# Patient Record
Sex: MALE | Race: BLACK OR AFRICAN AMERICAN | NOT HISPANIC OR LATINO
[De-identification: names, ages, dates, MRNs, and addresses within clinical notes are randomized per-mention and may not be internally consistent; named-entity substitution may affect disease eponyms.]

---

## 2020-11-23 ENCOUNTER — LABORATORY RESULT (OUTPATIENT)
Age: 55
End: 2020-11-23

## 2020-11-23 ENCOUNTER — APPOINTMENT (OUTPATIENT)
Dept: INTERNAL MEDICINE | Facility: CLINIC | Age: 55
End: 2020-11-23
Payer: COMMERCIAL

## 2020-11-23 VITALS
SYSTOLIC BLOOD PRESSURE: 160 MMHG | OXYGEN SATURATION: 97 % | BODY MASS INDEX: 30.1 KG/M2 | HEIGHT: 71 IN | HEART RATE: 65 BPM | TEMPERATURE: 98.2 F | WEIGHT: 215 LBS | DIASTOLIC BLOOD PRESSURE: 100 MMHG

## 2020-11-23 DIAGNOSIS — Z78.9 OTHER SPECIFIED HEALTH STATUS: ICD-10-CM

## 2020-11-23 DIAGNOSIS — Z23 ENCOUNTER FOR IMMUNIZATION: ICD-10-CM

## 2020-11-23 DIAGNOSIS — Z11.3 ENCOUNTER FOR SCREENING FOR INFECTIONS WITH A PREDOMINANTLY SEXUAL MODE OF TRANSMISSION: ICD-10-CM

## 2020-11-23 DIAGNOSIS — Z80.3 FAMILY HISTORY OF MALIGNANT NEOPLASM OF BREAST: ICD-10-CM

## 2020-11-23 PROCEDURE — 99386 PREV VISIT NEW AGE 40-64: CPT | Mod: 25

## 2020-11-23 PROCEDURE — G0444 DEPRESSION SCREEN ANNUAL: CPT

## 2020-11-23 PROCEDURE — 36415 COLL VENOUS BLD VENIPUNCTURE: CPT

## 2020-11-23 PROCEDURE — G0442 ANNUAL ALCOHOL SCREEN 15 MIN: CPT

## 2020-11-23 NOTE — HEALTH RISK ASSESSMENT
[Good] : ~his/her~  mood as  good [Yes] : Yes [2 - 4 times a month (2 pts)] : 2-4 times a month (2 points) [3 or 4 (1 pt)] : 3 or 4  (1 point) [Never (0 pts)] : Never (0 points) [No falls in past year] : Patient reported no falls in the past year [0] : 2) Feeling down, depressed, or hopeless: Not at all (0) [Patient reported colonoscopy was normal] : Patient reported colonoscopy was normal [With Significant Other] : lives with significant other [Employed] : employed [] :  [] : No [Audit-CScore] : 3 [de-identified] : walking  [[id number]] : 0 [Reports changes in hearing] : Reports no changes in hearing [Reports changes in vision] : Reports no changes in vision [Reports changes in dental health] : Reports no changes in dental health [ColonoscopyDate] : 2011  [FreeTextEntry2] : [place]

## 2020-11-23 NOTE — HISTORY OF PRESENT ILLNESS
[de-identified] : new pt referred by his co worker \par \par HTN dx 2012 \par - on metoprolol 50 BID and hydralazine 25 tid \par - BP has been high in MD office - does not know if this is working - today he did not take his medications \par

## 2020-11-23 NOTE — ASSESSMENT
[FreeTextEntry1] : HTN- elevated BP readings confirmed - pt did not take his medications this morning \par --no cp sob palpitation or dizzy spells , no leg swelling \par -continue current medications \par - educated low salt diet , avoid canned , processed and fast food ,chips , bagged items ,  start exercise daily 30- 40 minutes  , loose weight \par -f/u 4- 6 weeks check BP\par \par Health maintenance \par colonoscope-> 10 yr ago referral for gastro given \par Psa- ordered \par Flu vaccine -refused \par Tdap - refused \par HEP C screening-(educated pt CDC recommendation one time screening for patients born between 1945 -1965 )- ordered \par STD offered-ordered \par Dermatology consult advised -for skin ca screening \par \par RTC 4 weeks

## 2020-11-24 LAB
25(OH)D3 SERPL-MCNC: 29.3 NG/ML
ALBUMIN SERPL ELPH-MCNC: 4.5 G/DL
ALP BLD-CCNC: 76 U/L
ALT SERPL-CCNC: 22 U/L
ANION GAP SERPL CALC-SCNC: 9 MMOL/L
APPEARANCE: CLEAR
AST SERPL-CCNC: 19 U/L
BASOPHILS # BLD AUTO: 0.05 K/UL
BASOPHILS NFR BLD AUTO: 0.8 %
BILIRUB SERPL-MCNC: 0.4 MG/DL
BILIRUBIN URINE: NEGATIVE
BLOOD URINE: NEGATIVE
BUN SERPL-MCNC: 15 MG/DL
CALCIUM SERPL-MCNC: 9.2 MG/DL
CHLORIDE SERPL-SCNC: 105 MMOL/L
CHOLEST SERPL-MCNC: 214 MG/DL
CO2 SERPL-SCNC: 27 MMOL/L
COLOR: YELLOW
CREAT SERPL-MCNC: 0.98 MG/DL
EOSINOPHIL # BLD AUTO: 0.1 K/UL
EOSINOPHIL NFR BLD AUTO: 1.5 %
ESTIMATED AVERAGE GLUCOSE: 126 MG/DL
GLUCOSE QUALITATIVE U: NEGATIVE
GLUCOSE SERPL-MCNC: 100 MG/DL
HBA1C MFR BLD HPLC: 6 %
HBV CORE IGG+IGM SER QL: REACTIVE
HBV SURFACE AB SER QL: NONREACTIVE
HBV SURFACE AG SER QL: NONREACTIVE
HCT VFR BLD CALC: 46.3 %
HCV AB SER QL: NONREACTIVE
HCV S/CO RATIO: 0.08 S/CO
HDLC SERPL-MCNC: 38 MG/DL
HGB BLD-MCNC: 14.4 G/DL
HIV1+2 AB SPEC QL IA.RAPID: NONREACTIVE
IMM GRANULOCYTES NFR BLD AUTO: 0.2 %
KETONES URINE: NEGATIVE
LDLC SERPL CALC-MCNC: 146 MG/DL
LEUKOCYTE ESTERASE URINE: NEGATIVE
LYMPHOCYTES # BLD AUTO: 2.86 K/UL
LYMPHOCYTES NFR BLD AUTO: 43.3 %
MAN DIFF?: NORMAL
MCHC RBC-ENTMCNC: 29.6 PG
MCHC RBC-ENTMCNC: 31.1 GM/DL
MCV RBC AUTO: 95.3 FL
MONOCYTES # BLD AUTO: 0.67 K/UL
MONOCYTES NFR BLD AUTO: 10.2 %
NEUTROPHILS # BLD AUTO: 2.91 K/UL
NEUTROPHILS NFR BLD AUTO: 44 %
NITRITE URINE: NEGATIVE
NONHDLC SERPL-MCNC: 177 MG/DL
PH URINE: 6
PLATELET # BLD AUTO: 338 K/UL
POTASSIUM SERPL-SCNC: 4.3 MMOL/L
PROT SERPL-MCNC: 7.4 G/DL
PROTEIN URINE: ABNORMAL
PSA FREE FLD-MCNC: 30 %
PSA FREE SERPL-MCNC: 0.21 NG/ML
PSA SERPL-MCNC: 0.71 NG/ML
RBC # BLD: 4.86 M/UL
RBC # FLD: 13.6 %
SODIUM SERPL-SCNC: 142 MMOL/L
SPECIFIC GRAVITY URINE: 1.03
T PALLIDUM AB SER QL IA: NEGATIVE
TRIGL SERPL-MCNC: 153 MG/DL
TSH SERPL-ACNC: 0.94 UIU/ML
UROBILINOGEN URINE: NORMAL
VIT B12 SERPL-MCNC: 465 PG/ML
WBC # FLD AUTO: 6.6 K/UL

## 2020-11-25 LAB
C TRACH RRNA SPEC QL NAA+PROBE: NOT DETECTED
N GONORRHOEA RRNA SPEC QL NAA+PROBE: NOT DETECTED
SOURCE AMPLIFICATION: NORMAL

## 2021-05-17 ENCOUNTER — APPOINTMENT (OUTPATIENT)
Dept: INTERNAL MEDICINE | Facility: CLINIC | Age: 56
End: 2021-05-17
Payer: COMMERCIAL

## 2021-05-17 VITALS
BODY MASS INDEX: 30.1 KG/M2 | DIASTOLIC BLOOD PRESSURE: 100 MMHG | TEMPERATURE: 98.8 F | HEIGHT: 71 IN | HEART RATE: 67 BPM | OXYGEN SATURATION: 97 % | SYSTOLIC BLOOD PRESSURE: 180 MMHG | WEIGHT: 215 LBS

## 2021-05-17 PROCEDURE — 93000 ELECTROCARDIOGRAM COMPLETE: CPT

## 2021-05-17 PROCEDURE — 99072 ADDL SUPL MATRL&STAF TM PHE: CPT

## 2021-05-17 PROCEDURE — 99214 OFFICE O/P EST MOD 30 MIN: CPT | Mod: 25

## 2021-05-17 PROCEDURE — 36415 COLL VENOUS BLD VENIPUNCTURE: CPT

## 2021-05-17 NOTE — ASSESSMENT
[FreeTextEntry1] : HTN- elevated BP readings confirmed -170/100 \par -EKG-NSr at 57 bpm LVH - get [facility] for LVEF \par -educated pt risk of uncontrolled BP- including stroke , renal failure requiring dialysis, CAd , heart attack etc \par -Encouraged patient to eat low-salt diet, avoid canned foods processed food, fried food, eating out, and 2 include 3-4 servings of fruits and vegetables\par --no cp sob palpitation or dizzy spells , no leg swelling \par -add valsartan HCTZ 160-25 qd - cont metoprolol 50 BID and Hydralazine 25 tid \par -Follow up in 6 weeks to check blood pressure - referral to see cardio given \par \par Prediabetic AIC - AIC was 6 11/2020 , should be < 5.6 - eat  1800 kcal ADA diet, cut  rice, pasta, sugar, sweet, soda, juices, exercise daily 30 minutes, loose weight. \par  \par .Hyperlipidemia \par -check lipid panel fasting\par -Avoid animal fat, red meat cheese, butter, red meat, start exercise daily 30 minutes, loose weight.  \par \par Health maintenance \par colonoscope-> 10 yr ago referral for GASTRO given again , get FIT \par Psa- 11/2020 \par Flu vaccine -refused \par Tdap - refused \par HEP C screening-(educated pt CDC recommendation one time screening for patients born between 1945 -1965 )- 11/2020 neg \par STD offered- 11/2020 neg \par Dermatology consult advised -for skin ca screening \par got covid vaccine as per pt - does not have card on him \par

## 2021-05-17 NOTE — HISTORY OF PRESENT ILLNESS
[de-identified] : f/u on ch medical issues \par \par Hx HTN - dx 2012 \par - on metoprolol 50 BID and hydralazine 25 tid \par - BP has been high in MD office - does not know if this is working -saw cardio many yrs ago once only - gets his meds from old PCP \par \par predm - aic 6 11/2020 \par \par high chol

## 2021-05-18 LAB
ANION GAP SERPL CALC-SCNC: 12 MMOL/L
BUN SERPL-MCNC: 11 MG/DL
CALCIUM SERPL-MCNC: 9.2 MG/DL
CHLORIDE SERPL-SCNC: 107 MMOL/L
CHOLEST SERPL-MCNC: 192 MG/DL
CO2 SERPL-SCNC: 24 MMOL/L
CREAT SERPL-MCNC: 1.17 MG/DL
ESTIMATED AVERAGE GLUCOSE: 120 MG/DL
GLUCOSE SERPL-MCNC: 115 MG/DL
HBA1C MFR BLD HPLC: 5.8 %
HDLC SERPL-MCNC: 32 MG/DL
LDLC SERPL CALC-MCNC: 131 MG/DL
NONHDLC SERPL-MCNC: 161 MG/DL
POTASSIUM SERPL-SCNC: 3.7 MMOL/L
SODIUM SERPL-SCNC: 142 MMOL/L
TRIGL SERPL-MCNC: 147 MG/DL

## 2021-05-24 LAB — HEMOCCULT STL QL IA: NEGATIVE

## 2021-06-28 ENCOUNTER — APPOINTMENT (OUTPATIENT)
Dept: INTERNAL MEDICINE | Facility: CLINIC | Age: 56
End: 2021-06-28

## 2021-07-26 ENCOUNTER — APPOINTMENT (OUTPATIENT)
Dept: INTERNAL MEDICINE | Facility: CLINIC | Age: 56
End: 2021-07-26
Payer: COMMERCIAL

## 2021-07-26 VITALS
DIASTOLIC BLOOD PRESSURE: 80 MMHG | TEMPERATURE: 97.3 F | BODY MASS INDEX: 29.29 KG/M2 | HEART RATE: 65 BPM | WEIGHT: 210 LBS | SYSTOLIC BLOOD PRESSURE: 140 MMHG | OXYGEN SATURATION: 97 %

## 2021-07-26 VITALS — DIASTOLIC BLOOD PRESSURE: 80 MMHG | SYSTOLIC BLOOD PRESSURE: 128 MMHG

## 2021-07-26 DIAGNOSIS — R94.31 ABNORMAL ELECTROCARDIOGRAM [ECG] [EKG]: ICD-10-CM

## 2021-07-26 PROCEDURE — 99214 OFFICE O/P EST MOD 30 MIN: CPT | Mod: 25

## 2021-07-26 PROCEDURE — 36415 COLL VENOUS BLD VENIPUNCTURE: CPT

## 2021-07-26 PROCEDURE — 99072 ADDL SUPL MATRL&STAF TM PHE: CPT

## 2021-07-26 NOTE — HISTORY OF PRESENT ILLNESS
[de-identified] : f/u on ch medical issues \par last seen 5/17/21 f/u \par \par Hx HTN - dx 2012 \par - on metoprolol 50 BID and hydralazine 25 tid , added valsartan 160-25 since 5/2021 \par - BP has been high in MD office - does not know if this is working -saw cardio many yrs ago once only \par \par predm - aic 6 11/2020 --> 5.8 5/2021\par \par high chol \par

## 2021-07-26 NOTE — ASSESSMENT
[FreeTextEntry1] : HTN- elevated BP readings confirmed -128/80 left arm \par get Echo for LVF \par -educated pt risk of uncontrolled BP- including stroke , renal failure requiring dialysis, CAd , heart attack etc \par -Encouraged patient to eat low-salt diet, avoid canned foods processed food, fried food, eating out, and 2 include 3-4 servings of fruits and vegetables\par --no cp sob palpitation or dizzy spells , no leg swelling \par -cont  valsartan HCTZ 160-25 qd - cont metoprolol 50 BID and Hydralazine 25 tid \par -get renal panel \par -Follow up in 6 weeks to check blood pressure - referral to see cardio given \par \par Prediabetic AIC - AIC was 6 11/2020-->5.8  5/2021 , should be < 5.6 - eat 1800 kcal ADA diet, cut rice, pasta, sugar, sweet, soda, juices, exercise daily 30 minutes, loose weight. \par  \par .Hyperlipidemia \par -check lipid panel fasting\par -Avoid animal fat, red meat cheese, butter, red meat, start exercise daily 30 minutes, loose weight. \par \par Health maintenance \par colonoscope-> 10 yr ago referral for GASTRO given again , 5/2021 FIT -negative \par Psa- 11/2020 \par Flu vaccine -refused \par Tdap - refused \par HEP C screening-(educated pt CDC recommendation one time screening for patients born between 1945 -1965 )- 11/2020 neg \par STD offered- 11/2020 neg \par Dermatology consult advised -for skin ca screening \par got covid vaccine as per pt - does not have card on him \par

## 2021-07-27 LAB
ALBUMIN SERPL ELPH-MCNC: 4.5 G/DL
ANION GAP SERPL CALC-SCNC: 13 MMOL/L
BUN SERPL-MCNC: 21 MG/DL
CALCIUM SERPL-MCNC: 9.6 MG/DL
CHLORIDE SERPL-SCNC: 102 MMOL/L
CO2 SERPL-SCNC: 25 MMOL/L
CREAT SERPL-MCNC: 1.13 MG/DL
GLUCOSE SERPL-MCNC: 126 MG/DL
PHOSPHATE SERPL-MCNC: 2.7 MG/DL
POTASSIUM SERPL-SCNC: 4 MMOL/L
SODIUM SERPL-SCNC: 139 MMOL/L

## 2021-11-29 ENCOUNTER — APPOINTMENT (OUTPATIENT)
Dept: INTERNAL MEDICINE | Facility: CLINIC | Age: 56
End: 2021-11-29
Payer: COMMERCIAL

## 2021-11-29 VITALS
BODY MASS INDEX: 29.96 KG/M2 | WEIGHT: 214 LBS | OXYGEN SATURATION: 97 % | HEIGHT: 71 IN | DIASTOLIC BLOOD PRESSURE: 88 MMHG | TEMPERATURE: 98.3 F | SYSTOLIC BLOOD PRESSURE: 168 MMHG | HEART RATE: 62 BPM

## 2021-11-29 PROCEDURE — 99396 PREV VISIT EST AGE 40-64: CPT | Mod: 25

## 2021-11-29 PROCEDURE — G0444 DEPRESSION SCREEN ANNUAL: CPT | Mod: 59

## 2021-11-29 PROCEDURE — 36415 COLL VENOUS BLD VENIPUNCTURE: CPT

## 2021-11-29 PROCEDURE — G0442 ANNUAL ALCOHOL SCREEN 15 MIN: CPT

## 2021-11-29 NOTE — ASSESSMENT
[FreeTextEntry1] : \par HTN- elevated BP readings confirmed -160/90 \par get Echo for LVF \par -educated pt risk of uncontrolled BP- including stroke , renal failure requiring dialysis, CAd , heart attack etc \par -Encouraged patient to eat low-salt diet, avoid canned foods processed food, fried food, eating out, and 2 include 3-4 servings of fruits and vegetables\par --no cp sob palpitation or dizzy spells , no leg swelling \par -cont valsartan HCTZ 160-25 qd - cont metoprolol 50 BID and Hydralazine 25 tid \par -get renal panel \par -Follow up in 6 weeks to check blood pressure - referral to see cardio given \par \par Prediabetic AIC - AIC was 6 11/2020-->5.8 5/2021 , should be < 5.6 - eat 1800 kcal ADA diet, cut rice, pasta, sugar, sweet, soda, juices, exercise daily 30 minutes, loose weight. \par  \par .Hyperlipidemia \par -check lipid panel fasting\par -Avoid animal fat, red meat cheese, butter, red meat, start exercise daily 30 minutes, loose weight. \par \par Health maintenance \par colonoscope-> 10 yr ago referral for GASTRO given again , 5/2021 FIT -negative \par Psa- 11/2020 \par Flu vaccine -refused \par Tdap - refused \par HEP C screening-(educated pt CDC recommendation one time screening for patients born between 1945 -1965 )- 11/2020 neg \par STD offered- 11/2020 neg \par Dermatology consult advised -for skin ca screening \par covid vaccine as per pt -4/2021 [employer]  does not have card on him \par . \par

## 2021-11-29 NOTE — HISTORY OF PRESENT ILLNESS
[de-identified] : CPE \par did not follow any recommendation from last visit 7/26/21 \par - ran out of medications 10/2021 \par \par Hx HTN - dx 2012 \par - on metoprolol 50 BID and hydralazine 25 tid , added valsartan 160-25 since 5/2021 - ran out medication 10/2021 - has not taken pills since needs refill \par - BP has been high in MD office - does not know if this is working -saw cardio many yrs ago once only \par \par predm - aic 6 11/2020 --> 5.8 5/2021\par \par high chol \par

## 2021-11-29 NOTE — HEALTH RISK ASSESSMENT
[Good] : ~his/her~  mood as  good [Yes] : Yes [2 - 4 times a month (2 pts)] : 2-4 times a month (2 points) [1 or 2 (0 pts)] : 1 or 2 (0 points) [Never (0 pts)] : Never (0 points) [No falls in past year] : Patient reported no falls in the past year [0] : 2) Feeling down, depressed, or hopeless: Not at all (0) [PHQ-2 Negative - No further assessment needed] : PHQ-2 Negative - No further assessment needed [With Significant Other] : lives with significant other [Employed] : employed [] :  [Fully functional (bathing, dressing, toileting, transferring, walking, feeding)] : Fully functional (bathing, dressing, toileting, transferring, walking, feeding) [Fully functional (using the telephone, shopping, preparing meals, housekeeping, doing laundry, using] : Fully functional and needs no help or supervision to perform IADLs (using the telephone, shopping, preparing meals, housekeeping, doing laundry, using transportation, managing medications and managing finances) [] : No [Audit-CScore] : 2 [de-identified] : walking  [[id number]] : 0 [Reports changes in hearing] : Reports no changes in hearing [Reports changes in vision] : Reports no changes in vision [Reports changes in dental health] : Reports no changes in dental health [FreeTextEntry2] : [place] MARYSE

## 2021-12-01 LAB
25(OH)D3 SERPL-MCNC: 27.6 NG/ML
ALBUMIN SERPL ELPH-MCNC: 4.2 G/DL
ALP BLD-CCNC: 67 U/L
ALT SERPL-CCNC: 19 U/L
ANION GAP SERPL CALC-SCNC: 16 MMOL/L
APPEARANCE: CLEAR
AST SERPL-CCNC: 16 U/L
BASOPHILS # BLD AUTO: 0.03 K/UL
BASOPHILS NFR BLD AUTO: 0.4 %
BILIRUB SERPL-MCNC: 0.3 MG/DL
BILIRUBIN URINE: NEGATIVE
BLOOD URINE: NEGATIVE
BUN SERPL-MCNC: 18 MG/DL
CALCIUM SERPL-MCNC: 9.5 MG/DL
CHLORIDE SERPL-SCNC: 107 MMOL/L
CHOLEST SERPL-MCNC: 218 MG/DL
CO2 SERPL-SCNC: 22 MMOL/L
COLOR: YELLOW
CREAT SERPL-MCNC: 1.19 MG/DL
EOSINOPHIL # BLD AUTO: 0.1 K/UL
EOSINOPHIL NFR BLD AUTO: 1.4 %
ESTIMATED AVERAGE GLUCOSE: 120 MG/DL
GLUCOSE QUALITATIVE U: NEGATIVE
GLUCOSE SERPL-MCNC: 97 MG/DL
HBA1C MFR BLD HPLC: 5.8 %
HCT VFR BLD CALC: 42.6 %
HDLC SERPL-MCNC: 39 MG/DL
HGB BLD-MCNC: 13.9 G/DL
IMM GRANULOCYTES NFR BLD AUTO: 0.1 %
KETONES URINE: NEGATIVE
LDLC SERPL CALC-MCNC: 152 MG/DL
LEUKOCYTE ESTERASE URINE: NEGATIVE
LYMPHOCYTES # BLD AUTO: 2.9 K/UL
LYMPHOCYTES NFR BLD AUTO: 40.6 %
MAN DIFF?: NORMAL
MCHC RBC-ENTMCNC: 30.6 PG
MCHC RBC-ENTMCNC: 32.6 GM/DL
MCV RBC AUTO: 93.8 FL
MONOCYTES # BLD AUTO: 0.7 K/UL
MONOCYTES NFR BLD AUTO: 9.8 %
NEUTROPHILS # BLD AUTO: 3.41 K/UL
NEUTROPHILS NFR BLD AUTO: 47.7 %
NITRITE URINE: NEGATIVE
NONHDLC SERPL-MCNC: 179 MG/DL
PH URINE: 6
PLATELET # BLD AUTO: 332 K/UL
POTASSIUM SERPL-SCNC: 4.7 MMOL/L
PROT SERPL-MCNC: 7.2 G/DL
PROTEIN URINE: NORMAL
RBC # BLD: 4.54 M/UL
RBC # FLD: 14.3 %
SODIUM SERPL-SCNC: 145 MMOL/L
SPECIFIC GRAVITY URINE: 1.03
TRIGL SERPL-MCNC: 131 MG/DL
TSH SERPL-ACNC: 0.73 UIU/ML
UROBILINOGEN URINE: NORMAL
VIT B12 SERPL-MCNC: 375 PG/ML
WBC # FLD AUTO: 7.15 K/UL

## 2022-03-11 DIAGNOSIS — R58 HEMORRHAGE, NOT ELSEWHERE CLASSIFIED: ICD-10-CM

## 2022-04-22 ENCOUNTER — APPOINTMENT (OUTPATIENT)
Dept: INTERNAL MEDICINE | Facility: CLINIC | Age: 57
End: 2022-04-22
Payer: COMMERCIAL

## 2022-04-22 VITALS
WEIGHT: 211 LBS | DIASTOLIC BLOOD PRESSURE: 84 MMHG | SYSTOLIC BLOOD PRESSURE: 146 MMHG | BODY MASS INDEX: 29.54 KG/M2 | TEMPERATURE: 97.9 F | HEART RATE: 63 BPM | HEIGHT: 71 IN | OXYGEN SATURATION: 98 %

## 2022-04-22 VITALS — DIASTOLIC BLOOD PRESSURE: 84 MMHG | SYSTOLIC BLOOD PRESSURE: 130 MMHG

## 2022-04-22 DIAGNOSIS — K57.30 DIVERTICULOSIS OF LARGE INTESTINE W/OUT PERFORATION OR ABSCESS W/OUT BLEEDING: ICD-10-CM

## 2022-04-22 PROCEDURE — 36415 COLL VENOUS BLD VENIPUNCTURE: CPT

## 2022-04-22 PROCEDURE — 99214 OFFICE O/P EST MOD 30 MIN: CPT | Mod: 25

## 2022-04-22 RX ORDER — MULTIVITAMIN
TABLET ORAL DAILY
Refills: 0 | Status: ACTIVE | COMMUNITY
Start: 2022-04-22

## 2022-04-22 RX ORDER — ASCORBIC ACID 500 MG
500 TABLET ORAL DAILY
Refills: 0 | Status: ACTIVE | COMMUNITY
Start: 2022-04-22

## 2022-04-22 RX ORDER — ZINC SULFATE TAB 220 MG (50 MG ZINC EQUIVALENT) 220 (50 ZN) MG
220 (50 ZN) TAB ORAL
Refills: 0 | Status: ACTIVE | COMMUNITY
Start: 2022-04-22

## 2022-04-22 NOTE — HISTORY OF PRESENT ILLNESS
[de-identified] : seen for f/u on ch medical issues \par \par Hx HTN - dx 2012 - saw cardio Dr Devin Lira [phone] 4/11/22  - did Echo was adviced to do stress test - pending insurance approval - has f/u pending \par - on metoprolol 50 BID and hydralazine 25 tid , added valsartan 160-25 since 5/2021 - ran out medication 10/2021 - has not taken pills since needs refill \par - BP has been high in MD office - does not know if this is working -saw cardio many yrs ago once only \par \par predm - aic 6 11/2020 --> 5.8 11/2021\par \par high chol \par

## 2022-04-22 NOTE — ASSESSMENT
[FreeTextEntry1] : \par HTN- elevated BP readings confirmed -130/84\par -saw cardiologist -Dr Devin Lira [phone] 4/11/22  - did Echo was adviced to do stress test - pending insurance approval - has f/u pending \par -educated pt risk of uncontrolled BP- including stroke , renal failure requiring dialysis, CAd , heart attack etc \par -Encouraged patient to eat low-salt diet, avoid canned foods processed food, fried food, eating out, and 2 include 3-4 servings of fruits and vegetables\par --no cp sob palpitation or dizzy spells , no leg swelling \par -cont valsartan HCTZ 160-25 qd - cont metoprolol 50 BID and Hydralazine 25 tid \par -Follow up in 3 months \par \par Prediabetic AIC - AIC ->5.8 11/2021 , should be < 5.6 - eat 1800 kcal ADA diet, cut rice, pasta, sugar, sweet, soda, juices, exercise daily 30 minutes, loose weight. \par  \par .Hyperlipidemia ASCVD risk 20.26% statins advised -start Crestor 10 qhs 4/22/22\par -check lipid panel / LFT fasting 3 months \par -Avoid animal fat, red meat cheese, butter, red meat, start exercise daily 30 minutes, loose weight. \par \par Health maintenance \par colonoscope->4/18/22 diverticulosis - due in  10 yr , 5/2021 FIT -negative \par Psa- 11/2020 \par Flu vaccine -refused \par Tdap - refused \par HEP C screening-(educated pt CDC recommendation one time screening for patients born between 1945 -1965 )- 11/2020 neg \par STD offered- 11/2020 neg \par Dermatology consult advised -for skin ca screening \par covid vaccine as per pt -4/15/2021 ,5/6/21 booster - 2/2022 Pf[employer] does not have card on him

## 2022-04-25 LAB
CHOLEST SERPL-MCNC: 207 MG/DL
ESTIMATED AVERAGE GLUCOSE: 128 MG/DL
HBA1C MFR BLD HPLC: 6.1 %
HDLC SERPL-MCNC: 36 MG/DL
LDLC SERPL CALC-MCNC: 140 MG/DL
NONHDLC SERPL-MCNC: 171 MG/DL
TRIGL SERPL-MCNC: 154 MG/DL

## 2022-07-21 ENCOUNTER — RX RENEWAL (OUTPATIENT)
Age: 57
End: 2022-07-21

## 2022-08-29 ENCOUNTER — APPOINTMENT (OUTPATIENT)
Dept: INTERNAL MEDICINE | Facility: CLINIC | Age: 57
End: 2022-08-29

## 2022-08-29 VITALS
HEART RATE: 67 BPM | BODY MASS INDEX: 28 KG/M2 | WEIGHT: 200 LBS | OXYGEN SATURATION: 98 % | DIASTOLIC BLOOD PRESSURE: 93 MMHG | HEIGHT: 71 IN | SYSTOLIC BLOOD PRESSURE: 148 MMHG | TEMPERATURE: 98.4 F

## 2022-08-29 VITALS — DIASTOLIC BLOOD PRESSURE: 80 MMHG | SYSTOLIC BLOOD PRESSURE: 122 MMHG

## 2022-08-29 DIAGNOSIS — I10 ESSENTIAL (PRIMARY) HYPERTENSION: ICD-10-CM

## 2022-08-29 DIAGNOSIS — I45.10 UNSPECIFIED RIGHT BUNDLE-BRANCH BLOCK: ICD-10-CM

## 2022-08-29 DIAGNOSIS — K64.8 OTHER HEMORRHOIDS: ICD-10-CM

## 2022-08-29 PROCEDURE — 99214 OFFICE O/P EST MOD 30 MIN: CPT | Mod: 25

## 2022-08-29 PROCEDURE — 36415 COLL VENOUS BLD VENIPUNCTURE: CPT

## 2022-08-29 RX ORDER — PEN NEEDLE, DIABETIC 33 GX5/32"
NEEDLE, DISPOSABLE MISCELLANEOUS
Qty: 1 | Refills: 0 | Status: ACTIVE | COMMUNITY
Start: 2022-08-29 | End: 1900-01-01

## 2022-08-29 NOTE — HISTORY OF PRESENT ILLNESS
[de-identified] : seen for f/u on ch medical issues \par \par Hx HTN - did not take medication this morning - but compliant daily as per patient - does not have a good BP machine \par - dx 2012 - saw cardio Dr Devin Lira [phone] 4/11/22 - did Echo was advised to do stress test - pending insurance approval - has f/u pending \par - on metoprolol 50 BID and hydralazine 25 tid , added valsartan 160-25 since 5/2021 - ran out medication 10/2021 - has not taken pills since needs refill \par - BP has been high in MD office - does not know if this is working -saw cardio many yrs ago once only \par \par predm - aic 6.1 4/2022 \par \par high chol - LDL- 140 4/2022 \par

## 2022-08-29 NOTE — ASSESSMENT
[FreeTextEntry1] : HTN-  BP readings confirmed -122/80\par -saw cardiologist -Dr Devin Lira [phone] 4/11/22 - did Echo was adviced to do stress test - pending insurance approval - has f/u pending \par -educated pt risk of uncontrolled BP- including stroke , renal failure requiring dialysis, CAd , heart attack etc \par -Encouraged patient to eat low-salt diet, avoid canned foods processed food, fried food, eating out, and 2 include 3-4 servings of fruits and vegetables\par --no cp sob palpitation or dizzy spells , no leg swelling \par -cont valsartan HCTZ 160-25 qd - cont metoprolol 50 BID and Hydralazine 25 tid \par -Follow up in 3 months \par \par Prediabetic AIC - AIC ->6.1 4/2022  , should be < 5.6 - eat 1800 kcal ADA diet, cut rice, pasta, sugar, sweet, soda, juices, exercise daily 30 minutes, loose weight. \par  \par .Hyperlipidemia ASCVD risk 20.26% statins advised -start Crestor 10 qhs 4/22/22- non comp - restart crestor 8/29/22 \par -check lipid panel / LFT fasting 3 months \par -Avoid animal fat, red meat cheese, butter, red meat, start exercise daily 30 minutes, loose weight. \par \par int hemorroids- s/- colonoscope 4/2022 - avoid constipation - rx trial of Anusol supposutory qhs 1 week \par \par Health maintenance \par colonoscope->4/18/22 diverticulosis - due in 10 yr , 5/2021 FIT -negative \par Psa- 11/2020 \par Flu vaccine -refused \par Tdap - refused \par HEP C screening-(educated pt CDC recommendation one time screening for patients born between 1945 -1965 )- 11/2020 neg \par STD offered- 11/2020 neg \par Dermatology consult advised -for skin ca screening \par covid vaccine as per pt -4/15/2021 ,5/6/21 booster - 2/2022 Pf[employer] does not have card on him. \par Shingrex advised

## 2022-08-30 PROBLEM — I45.10 INCOMPLETE RBBB: Status: ACTIVE | Noted: 2022-08-30

## 2022-08-30 LAB
ALBUMIN SERPL ELPH-MCNC: 4.4 G/DL
ALP BLD-CCNC: 65 U/L
ALT SERPL-CCNC: 15 U/L
ANION GAP SERPL CALC-SCNC: 13 MMOL/L
AST SERPL-CCNC: 19 U/L
BILIRUB SERPL-MCNC: 0.4 MG/DL
BUN SERPL-MCNC: 15 MG/DL
CALCIUM SERPL-MCNC: 9.5 MG/DL
CHLORIDE SERPL-SCNC: 105 MMOL/L
CHOLEST SERPL-MCNC: 212 MG/DL
CO2 SERPL-SCNC: 25 MMOL/L
CREAT SERPL-MCNC: 1.04 MG/DL
EGFR: 84 ML/MIN/1.73M2
ESTIMATED AVERAGE GLUCOSE: 123 MG/DL
GLUCOSE SERPL-MCNC: 98 MG/DL
HBA1C MFR BLD HPLC: 5.9 %
HDLC SERPL-MCNC: 37 MG/DL
LDLC SERPL CALC-MCNC: 147 MG/DL
NONHDLC SERPL-MCNC: 176 MG/DL
POTASSIUM SERPL-SCNC: 3.7 MMOL/L
PROT SERPL-MCNC: 7.5 G/DL
SODIUM SERPL-SCNC: 143 MMOL/L
TRIGL SERPL-MCNC: 142 MG/DL

## 2022-09-23 ENCOUNTER — RX RENEWAL (OUTPATIENT)
Age: 57
End: 2022-09-23

## 2022-09-25 ENCOUNTER — RX RENEWAL (OUTPATIENT)
Age: 57
End: 2022-09-25

## 2022-10-21 ENCOUNTER — RX RENEWAL (OUTPATIENT)
Age: 57
End: 2022-10-21

## 2023-01-09 ENCOUNTER — APPOINTMENT (OUTPATIENT)
Dept: INTERNAL MEDICINE | Facility: CLINIC | Age: 58
End: 2023-01-09

## 2023-01-25 ENCOUNTER — RX RENEWAL (OUTPATIENT)
Age: 58
End: 2023-01-25

## 2023-03-20 ENCOUNTER — APPOINTMENT (OUTPATIENT)
Dept: INTERNAL MEDICINE | Facility: CLINIC | Age: 58
End: 2023-03-20
Payer: COMMERCIAL

## 2023-03-20 ENCOUNTER — LABORATORY RESULT (OUTPATIENT)
Age: 58
End: 2023-03-20

## 2023-03-20 VITALS
SYSTOLIC BLOOD PRESSURE: 192 MMHG | TEMPERATURE: 98 F | OXYGEN SATURATION: 98 % | WEIGHT: 218 LBS | HEART RATE: 58 BPM | HEIGHT: 71 IN | DIASTOLIC BLOOD PRESSURE: 98 MMHG | BODY MASS INDEX: 30.52 KG/M2

## 2023-03-20 DIAGNOSIS — Z00.00 ENCOUNTER FOR GENERAL ADULT MEDICAL EXAMINATION W/OUT ABNORMAL FINDINGS: ICD-10-CM

## 2023-03-20 DIAGNOSIS — Z12.5 ENCOUNTER FOR SCREENING FOR MALIGNANT NEOPLASM OF PROSTATE: ICD-10-CM

## 2023-03-20 PROCEDURE — 99396 PREV VISIT EST AGE 40-64: CPT | Mod: 25

## 2023-03-20 PROCEDURE — 36415 COLL VENOUS BLD VENIPUNCTURE: CPT

## 2023-03-20 RX ORDER — HYDROCORTISONE ACETATE 25 MG/1
25 SUPPOSITORY RECTAL
Qty: 1 | Refills: 1 | Status: ACTIVE | COMMUNITY
Start: 2022-08-29 | End: 1900-01-01

## 2023-03-20 NOTE — PHYSICAL EXAM
[No Acute Distress] : no acute distress [Well Developed] : well developed [Well-Appearing] : well-appearing [Normal Sclera/Conjunctiva] : normal sclera/conjunctiva [PERRL] : pupils equal round and reactive to light [EOMI] : extraocular movements intact [Normal Outer Ear/Nose] : the outer ears and nose were normal in appearance [Normal Oropharynx] : the oropharynx was normal [No JVD] : no jugular venous distention [No Lymphadenopathy] : no lymphadenopathy [Supple] : supple [Thyroid Normal, No Nodules] : the thyroid was normal and there were no nodules present [No Respiratory Distress] : no respiratory distress  [No Accessory Muscle Use] : no accessory muscle use [Clear to Auscultation] : lungs were clear to auscultation bilaterally [Normal Rate] : normal rate  [Regular Rhythm] : with a regular rhythm [Normal S1, S2] : normal S1 and S2 [No Murmur] : no murmur heard [No Carotid Bruits] : no carotid bruits [No Abdominal Bruit] : a ~M bruit was not heard ~T in the abdomen [No Varicosities] : no varicosities [Pedal Pulses Present] : the pedal pulses are present [No Edema] : there was no peripheral edema [No Palpable Aorta] : no palpable aorta [No Extremity Clubbing/Cyanosis] : no extremity clubbing/cyanosis [Soft] : abdomen soft [Non Tender] : non-tender [Non-distended] : non-distended [No Masses] : no abdominal mass palpated [No HSM] : no HSM [Normal Bowel Sounds] : normal bowel sounds [Normal Posterior Cervical Nodes] : no posterior cervical lymphadenopathy [Normal Anterior Cervical Nodes] : no anterior cervical lymphadenopathy [No CVA Tenderness] : no CVA  tenderness [No Spinal Tenderness] : no spinal tenderness [No Joint Swelling] : no joint swelling [Grossly Normal Strength/Tone] : grossly normal strength/tone [No Rash] : no rash [Coordination Grossly Intact] : coordination grossly intact [No Focal Deficits] : no focal deficits [Normal Gait] : normal gait [Deep Tendon Reflexes (DTR)] : deep tendon reflexes were 2+ and symmetric [Normal Affect] : the affect was normal [Normal Insight/Judgement] : insight and judgment were intact

## 2023-03-20 NOTE — HEALTH RISK ASSESSMENT
[Good] : ~his/her~  mood as  good [Yes] : Yes [2 - 4 times a month (2 pts)] : 2-4 times a month (2 points) [1 or 2 (0 pts)] : 1 or 2 (0 points) [Never (0 pts)] : Never (0 points) [No] : In the past 12 months have you used drugs other than those required for medical reasons? No [No falls in past year] : Patient reported no falls in the past year [0] : 2) Feeling down, depressed, or hopeless: Not at all (0) [PHQ-2 Negative - No further assessment needed] : PHQ-2 Negative - No further assessment needed [With Significant Other] : lives with significant other [Employed] : employed [] :  [Audit-CScore] : 2 [de-identified] : Sedentary  [[id number]] : 0 [Reports changes in hearing] : Reports no changes in hearing [Reports changes in vision] : Reports no changes in vision [Reports changes in dental health] : Reports no changes in dental health [FreeTextEntry2] : [place] MARYSE.

## 2023-03-20 NOTE — ASSESSMENT
[FreeTextEntry1] : HTN- BP readings confirmed -160/100 - ran out few as per tp ? compliance refilled all medication \par -saw cardiologist -Dr Devin Lira [phone] 4/11/22 - did Echo was adviced to do stress test - pending insurance approval - his f/u pending \par -educated pt risk of uncontrolled BP- including stroke , renal failure requiring dialysis, CAd , heart attack etc \par -Encouraged patient to eat low-salt diet, avoid canned foods processed food, fried food, eating out, and 2 include 3-4 servings of fruits and vegetables\par --no cp sob palpitation or dizzy spells , no leg swelling \par -cont valsartan HCTZ 160-25 qd - cont metoprolol 50 BID and Hydralazine 25 tid \par -Follow up in 4-6 weeks \par \par Prediabetic AIC - AIC ->6.1 4/2022--> 5.9 8/2022 , should be < 5.6 - eat 1800 kcal ADA diet, cut rice, pasta, sugar, sweet, soda, juices, exercise daily 30 minutes, loose weight. \par  \par .Hyperlipidemia ASCVD risk 20.26% statins advised -start Crestor 10 qhs 4/22/22- non comp - restart crestor 8/29/22 \par -check lipid panel / LFT fasting \par -Avoid animal fat, red meat cheese, butter, red meat, start exercise daily 30 minutes, loose weight. \par \par int hemorroids- s/- colonoscope 4/2022 - avoid constipation - rx trial of Anusol supposutory qhs 1 week \par \par Health maintenance \par colonoscope->4/18/22 diverticulosis - due in 10 yr , 5/2021 FIT -negative \par Psa- 11/2020 \par Flu vaccine -refused \par Tdap - refused \par HEP C screening-(educated pt CDC recommendation one time screening for patients born between 1945 -1965 )- 11/2020 neg \par STD offered- 11/2020 neg \par Dermatology consult advised -for skin ca screening \par covid vaccine as per pt -4/15/2021 ,5/6/21 booster - 2/2022 Pf[employer] does not have card on him. \par Shingrex advised. \par \par

## 2023-03-20 NOTE — HISTORY OF PRESENT ILLNESS
[de-identified] : this is a 57 male with pmhx of HTN, Predm , HLD seen for CPE \par \par blood in stools every bowel movement - 2 yrs + saw gastro did colonoscope 4/2022 - diverticulosis and hemorroids - due 10 yrs \par \par Hx HTN - did not take medication this morning - but compliant daily as per patient - does not have a good BP machine \par - dx 2012 - saw cardio Dr Devin Lira [phone] 4/11/22 - did Echo was advised to do stress test - pending insurance approval - has f/u pending \par - on metoprolol 50 BID and hydralazine 25 tid , added valsartan 160-25 since 5/2021 - ran out medication 10/2021 - has not taken pills since needs refill \par - BP has been high in MD office - does not know if this is working -saw cardio many yrs ago once only \par \par predm - aic 6.1 4/2022 --> 5.9 8/22 \par \par high chol - LDL- 140 4/2022 \par

## 2023-03-21 LAB
ALBUMIN SERPL ELPH-MCNC: 4.3 G/DL
ALP BLD-CCNC: 75 U/L
ALT SERPL-CCNC: 21 U/L
ANION GAP SERPL CALC-SCNC: 12 MMOL/L
APPEARANCE: CLEAR
AST SERPL-CCNC: 22 U/L
BASOPHILS # BLD AUTO: 0.04 K/UL
BASOPHILS NFR BLD AUTO: 0.6 %
BILIRUB SERPL-MCNC: 0.3 MG/DL
BILIRUBIN URINE: NEGATIVE
BLOOD URINE: NEGATIVE
BUN SERPL-MCNC: 16 MG/DL
CALCIUM SERPL-MCNC: 9.7 MG/DL
CHLORIDE SERPL-SCNC: 105 MMOL/L
CHOLEST SERPL-MCNC: 177 MG/DL
CO2 SERPL-SCNC: 22 MMOL/L
COLOR: YELLOW
CREAT SERPL-MCNC: 0.93 MG/DL
EGFR: 96 ML/MIN/1.73M2
EOSINOPHIL # BLD AUTO: 0.13 K/UL
EOSINOPHIL NFR BLD AUTO: 1.9 %
ESTIMATED AVERAGE GLUCOSE: 131 MG/DL
GLUCOSE QUALITATIVE U: NEGATIVE
GLUCOSE SERPL-MCNC: 81 MG/DL
HBA1C MFR BLD HPLC: 6.2 %
HCT VFR BLD CALC: 43.2 %
HDLC SERPL-MCNC: 41 MG/DL
HGB BLD-MCNC: 14.1 G/DL
IMM GRANULOCYTES NFR BLD AUTO: 0.1 %
KETONES URINE: NEGATIVE
LDLC SERPL CALC-MCNC: 109 MG/DL
LEUKOCYTE ESTERASE URINE: ABNORMAL
LYMPHOCYTES # BLD AUTO: 3.47 K/UL
LYMPHOCYTES NFR BLD AUTO: 52 %
MAN DIFF?: NORMAL
MCHC RBC-ENTMCNC: 29.6 PG
MCHC RBC-ENTMCNC: 32.6 GM/DL
MCV RBC AUTO: 90.8 FL
MONOCYTES # BLD AUTO: 0.63 K/UL
MONOCYTES NFR BLD AUTO: 9.4 %
NEUTROPHILS # BLD AUTO: 2.39 K/UL
NEUTROPHILS NFR BLD AUTO: 36 %
NITRITE URINE: NEGATIVE
NONHDLC SERPL-MCNC: 137 MG/DL
PH URINE: 6
PLATELET # BLD AUTO: 289 K/UL
POTASSIUM SERPL-SCNC: 4.3 MMOL/L
PROT SERPL-MCNC: 7.3 G/DL
PROTEIN URINE: ABNORMAL
PSA FREE FLD-MCNC: 30 %
PSA FREE SERPL-MCNC: 0.32 NG/ML
PSA SERPL-MCNC: 1.04 NG/ML
RBC # BLD: 4.76 M/UL
RBC # FLD: 14.7 %
SODIUM SERPL-SCNC: 140 MMOL/L
SPECIFIC GRAVITY URINE: 1.04
TRIGL SERPL-MCNC: 141 MG/DL
TSH SERPL-ACNC: 0.98 UIU/ML
UROBILINOGEN URINE: NORMAL
VIT B12 SERPL-MCNC: 389 PG/ML
WBC # FLD AUTO: 6.67 K/UL

## 2023-05-15 ENCOUNTER — APPOINTMENT (OUTPATIENT)
Dept: INTERNAL MEDICINE | Facility: CLINIC | Age: 58
End: 2023-05-15
Payer: COMMERCIAL

## 2023-05-15 VITALS
SYSTOLIC BLOOD PRESSURE: 171 MMHG | HEART RATE: 56 BPM | WEIGHT: 224 LBS | BODY MASS INDEX: 31.36 KG/M2 | HEIGHT: 71 IN | TEMPERATURE: 98.2 F | RESPIRATION RATE: 18 BRPM | DIASTOLIC BLOOD PRESSURE: 101 MMHG | OXYGEN SATURATION: 98 %

## 2023-05-15 VITALS — SYSTOLIC BLOOD PRESSURE: 158 MMHG | DIASTOLIC BLOOD PRESSURE: 98 MMHG

## 2023-05-15 DIAGNOSIS — R73.03 PREDIABETES.: ICD-10-CM

## 2023-05-15 DIAGNOSIS — E78.5 HYPERLIPIDEMIA, UNSPECIFIED: ICD-10-CM

## 2023-05-15 DIAGNOSIS — I10 ESSENTIAL (PRIMARY) HYPERTENSION: ICD-10-CM

## 2023-05-15 PROCEDURE — 99214 OFFICE O/P EST MOD 30 MIN: CPT

## 2023-05-15 RX ORDER — VALSARTAN AND HYDROCHLOROTHIAZIDE 320; 25 MG/1; MG/1
320-25 TABLET, FILM COATED ORAL
Qty: 90 | Refills: 0 | Status: ACTIVE | COMMUNITY
Start: 2021-05-17 | End: 1900-01-01

## 2023-05-15 NOTE — ASSESSMENT
[FreeTextEntry1] : HTN- BP readings confirmed -158/98 - ran out few as per tp ? compliance with medications  \par -saw cardiologist -Dr Devin Lira [phone] 4/11/22 - did Echo was adviced to do stress test - pending insurance approval - his f/u pending \par -educated pt risk of uncontrolled BP- including stroke , renal failure requiring dialysis, CAd , heart attack etc \par -Encouraged patient to eat low-salt diet, avoid canned foods processed food, fried food, eating out, and 2 include 3-4 servings of fruits and vegetables\par --no cp sob palpitation or dizzy spells , no leg swelling \par -change  valsartan HCTZ 320-25 2 tab qd - cont metoprolol 50 BID and Hydralazine 25 tid \par -Follow up in 2-3 weeks \par \par Prediabetic AIC - AIC ->62 3/2023  , should be < 5.6 - eat 1800 kcal ADA diet, cut rice, pasta, sugar, sweet, soda, juices, exercise daily 30 minutes, loose weight. \par  \par .Hyperlipidemia ASCVD risk 20.26% statins advised -start Crestor 10 qhs 4/22/22- non comp - restart crestor 8/29/22 \par [id number] 3/2023 \par -check lipid panel / LFT fasting \par -Avoid animal fat, red meat cheese, butter, red meat, start exercise daily 30 minutes, loose weight. \par \par int hemorroids- s/- colonoscope 4/2022 - avoid constipation - rx trial of Anusol supposutory qhs 1 week \par \par Health maintenance \par colonoscope->4/18/22 diverticulosis - due in 10 yr , 5/2021 FIT -negative \par Psa- 11/2020 \par Flu vaccine -refused \par Tdap - refused \par HEP C screening-(educated pt CDC recommendation one time screening for patients born between 1945 -1965 )- 11/2020 neg \par STD offered- 11/2020 neg \par Dermatology consult advised -for skin ca screening \par covid vaccine as per pt -4/15/2021 ,5/6/21 booster - 2/2022 Pf[employer] does not have card on him. \par Shingrex advised. \par

## 2023-05-15 NOTE — HISTORY OF PRESENT ILLNESS
[de-identified] : this is a 57 male with pmhx of HTN, Predm , HLD seen for CPE \par \par Hx HTN -missed few doses last week \par - did not take medication this morning - but compliant daily as per patient - does not have a good BP machine \par - dx 2012 - saw cardio Dr Devin Lira [phone] 4/11/22 - did Echo was advised to do stress test - pending insurance approval - has f/u pending \par - on metoprolol 50 BID and hydralazine 25 tid , added valsartan 160-25 since 5/2021 - ran out medication 10/2021 - has not taken pills since needs refill \par - BP has been high in MD office - does not know if this is working -saw cardio many yrs ago once only \par \par predm - aic 6.1 4/2022 --> 5.9 8/22 \par \par high chol - LDL- 140 4/2022 \par \par blood in stools every bowel movement - 2 yrs + saw gastro did colonoscope 4/2022 - diverticulosis and hemorroids - due 10 yrs

## 2023-06-05 ENCOUNTER — APPOINTMENT (OUTPATIENT)
Dept: INTERNAL MEDICINE | Facility: CLINIC | Age: 58
End: 2023-06-05

## 2023-06-05 NOTE — ASSESSMENT
[FreeTextEntry1] : HTN- BP readings confirmed -158/98 - ran out few as per tp ? compliance with medications \par -saw cardiologist -Dr Devin Lira [phone] 4/11/22 - did Echo was adviced to do stress test - pending insurance approval - his f/u pending \par -educated pt risk of uncontrolled BP- including stroke , renal failure requiring dialysis, CAd , heart attack etc \par -Encouraged patient to eat low-salt diet, avoid canned foods processed food, fried food, eating out, and 2 include 3-4 servings of fruits and vegetables\par --no cp sob palpitation or dizzy spells , no leg swelling \par -change valsartan HCTZ 320-25 2 tab qd - cont metoprolol 50 BID and Hydralazine 25 tid \par -Follow up in 2-3 weeks \par \par Prediabetic AIC - AIC ->6.2 3/2023 , should be < 5.6 - eat 1800 kcal ADA diet, cut rice, pasta, sugar, sweet, soda, juices, exercise daily 30 minutes, loose weight. \par  \par .Hyperlipidemia ASCVD risk 20.26% statins advised -start Crestor 10 qhs 4/22/22- non comp - restart crestor 8/29/22 \par [id number] 3/2023 \par -check lipid panel / LFT fasting \par -Avoid animal fat, red meat cheese, butter, red meat, start exercise daily 30 minutes, loose weight. \par \par int hemorroids- s/- colonoscope 4/2022 - avoid constipation - rx trial of Anusol supposutory qhs 1 week \par \par Health maintenance \par colonoscope->4/18/22 diverticulosis - due in 10 yr , 5/2021 FIT -negative \par Psa- 11/2020 \par Flu vaccine -refused \par Tdap - refused \par HEP C screening-(educated pt CDC recommendation one time screening for patients born between 1945 -1965 )- 11/2020 neg \par STD offered- 11/2020 neg \par Dermatology consult advised -for skin ca screening \par covid vaccine as per pt -4/15/2021 ,5/6/21 booster - 2/2022 Pfizer does not have card on him. \par Shingrex advised. \par  \par

## 2023-06-05 NOTE — HISTORY OF PRESENT ILLNESS
[de-identified] : this is a 57 male with pmhx of HTN, Predm , HLD seen for f/u \par \par Hx HTN -missed few doses last week \par - did not take medication this morning - but compliant daily as per patient - does not have a good BP machine \par - dx 2012 - saw cardio Dr Devin Lira [phone] 4/11/22 - did Echo was advised to do stress test - pending insurance approval - has f/u pending \par - on metoprolol 50 BID and hydralazine 25 tid , added valsartan 160-25 since 5/2021 - ran out medication 10/2021 - has not taken pills since needs refill \par - BP has been high in MD office - does not know if this is working -saw cardio many yrs ago once only \par \par predm - aic 6.1 4/2022 --> 5.9 8/22 \par \par high chol - LDL- 140 4/2022 \par \par blood in stools every bowel movement - 2 yrs + saw gastro did colonoscope 4/2022 - diverticulosis and hemorroids - due 10 yrs \par

## 2023-07-05 ENCOUNTER — RX RENEWAL (OUTPATIENT)
Age: 58
End: 2023-07-05

## 2023-07-19 RX ORDER — HYDRALAZINE HYDROCHLORIDE 25 MG/1
25 TABLET ORAL
Qty: 270 | Refills: 0 | Status: ACTIVE | COMMUNITY
Start: 2022-09-23 | End: 1900-01-01

## 2023-08-07 ENCOUNTER — RX RENEWAL (OUTPATIENT)
Age: 58
End: 2023-08-07

## 2023-10-06 ENCOUNTER — RX RENEWAL (OUTPATIENT)
Age: 58
End: 2023-10-06

## 2023-11-05 ENCOUNTER — RX RENEWAL (OUTPATIENT)
Age: 58
End: 2023-11-05

## 2023-11-05 RX ORDER — ROSUVASTATIN CALCIUM 10 MG/1
10 TABLET, FILM COATED ORAL
Qty: 90 | Refills: 0 | Status: ACTIVE | COMMUNITY
Start: 2022-04-22 | End: 1900-01-01

## 2024-01-08 ENCOUNTER — RX RENEWAL (OUTPATIENT)
Age: 59
End: 2024-01-08

## 2024-01-08 RX ORDER — METOPROLOL TARTRATE 50 MG/1
50 TABLET, FILM COATED ORAL
Qty: 180 | Refills: 3 | Status: ACTIVE | COMMUNITY
Start: 2022-09-23 | End: 1900-01-01

## 2025-01-21 ENCOUNTER — APPOINTMENT (OUTPATIENT)
Dept: INTERNAL MEDICINE | Facility: CLINIC | Age: 60
End: 2025-01-21

## 2025-05-07 ENCOUNTER — RX RENEWAL (OUTPATIENT)
Age: 60
End: 2025-05-07